# Patient Record
Sex: MALE | Race: BLACK OR AFRICAN AMERICAN | NOT HISPANIC OR LATINO | URBAN - METROPOLITAN AREA
[De-identification: names, ages, dates, MRNs, and addresses within clinical notes are randomized per-mention and may not be internally consistent; named-entity substitution may affect disease eponyms.]

---

## 2024-04-11 ENCOUNTER — APPOINTMENT (OUTPATIENT)
Dept: RADIOLOGY | Facility: HOSPITAL | Age: 69
End: 2024-04-11

## 2024-04-11 ENCOUNTER — CLINICAL SUPPORT (OUTPATIENT)
Dept: EMERGENCY MEDICINE | Facility: HOSPITAL | Age: 69
End: 2024-04-11

## 2024-04-11 ENCOUNTER — HOSPITAL ENCOUNTER (EMERGENCY)
Facility: HOSPITAL | Age: 69
Discharge: HOME | End: 2024-04-11
Attending: EMERGENCY MEDICINE

## 2024-04-11 VITALS
SYSTOLIC BLOOD PRESSURE: 148 MMHG | BODY MASS INDEX: 22.89 KG/M2 | WEIGHT: 188 LBS | OXYGEN SATURATION: 95 % | TEMPERATURE: 97.5 F | HEIGHT: 76 IN | RESPIRATION RATE: 16 BRPM | HEART RATE: 69 BPM | DIASTOLIC BLOOD PRESSURE: 104 MMHG

## 2024-04-11 DIAGNOSIS — R51.9 ACUTE INTRACTABLE HEADACHE, UNSPECIFIED HEADACHE TYPE: Primary | ICD-10-CM

## 2024-04-11 DIAGNOSIS — I10 HYPERTENSION, UNSPECIFIED TYPE: ICD-10-CM

## 2024-04-11 LAB
ALBUMIN SERPL BCP-MCNC: 4.3 G/DL (ref 3.4–5)
ALP SERPL-CCNC: 88 U/L (ref 33–136)
ALT SERPL W P-5'-P-CCNC: 17 U/L (ref 10–52)
ANION GAP SERPL CALC-SCNC: 15 MMOL/L (ref 10–20)
AST SERPL W P-5'-P-CCNC: 17 U/L (ref 9–39)
ATRIAL RATE: 78 BPM
BASOPHILS # BLD AUTO: 0.08 X10*3/UL (ref 0–0.1)
BASOPHILS NFR BLD AUTO: 0.9 %
BILIRUB SERPL-MCNC: 0.6 MG/DL (ref 0–1.2)
BUN SERPL-MCNC: 23 MG/DL (ref 6–23)
CALCIUM SERPL-MCNC: 10 MG/DL (ref 8.6–10.6)
CARDIAC TROPONIN I PNL SERPL HS: 14 NG/L (ref 0–53)
CARDIAC TROPONIN I PNL SERPL HS: 16 NG/L (ref 0–53)
CHLORIDE SERPL-SCNC: 105 MMOL/L (ref 98–107)
CO2 SERPL-SCNC: 25 MMOL/L (ref 21–32)
CREAT SERPL-MCNC: 1.3 MG/DL (ref 0.5–1.3)
EGFRCR SERPLBLD CKD-EPI 2021: 60 ML/MIN/1.73M*2
EOSINOPHIL # BLD AUTO: 0.43 X10*3/UL (ref 0–0.7)
EOSINOPHIL NFR BLD AUTO: 5 %
ERYTHROCYTE [DISTWIDTH] IN BLOOD BY AUTOMATED COUNT: 14 % (ref 11.5–14.5)
GLUCOSE SERPL-MCNC: 99 MG/DL (ref 74–99)
HCT VFR BLD AUTO: 44.5 % (ref 41–52)
HGB BLD-MCNC: 14.6 G/DL (ref 13.5–17.5)
HOLD SPECIMEN: NORMAL
IMM GRANULOCYTES # BLD AUTO: 0.02 X10*3/UL (ref 0–0.7)
IMM GRANULOCYTES NFR BLD AUTO: 0.2 % (ref 0–0.9)
LYMPHOCYTES # BLD AUTO: 1.46 X10*3/UL (ref 1.2–4.8)
LYMPHOCYTES NFR BLD AUTO: 16.8 %
MCH RBC QN AUTO: 27.3 PG (ref 26–34)
MCHC RBC AUTO-ENTMCNC: 32.8 G/DL (ref 32–36)
MCV RBC AUTO: 83 FL (ref 80–100)
MONOCYTES # BLD AUTO: 0.85 X10*3/UL (ref 0.1–1)
MONOCYTES NFR BLD AUTO: 9.8 %
NEUTROPHILS # BLD AUTO: 5.84 X10*3/UL (ref 1.2–7.7)
NEUTROPHILS NFR BLD AUTO: 67.3 %
NRBC BLD-RTO: 0 /100 WBCS (ref 0–0)
P AXIS: 75 DEGREES
P OFFSET: 183 MS
P ONSET: 143 MS
PLATELET # BLD AUTO: 187 X10*3/UL (ref 150–450)
POTASSIUM SERPL-SCNC: 4.1 MMOL/L (ref 3.5–5.3)
PR INTERVAL: 158 MS
PROT SERPL-MCNC: 7.8 G/DL (ref 6.4–8.2)
Q ONSET: 222 MS
QRS COUNT: 13 BEATS
QRS DURATION: 106 MS
QT INTERVAL: 410 MS
QTC CALCULATION(BAZETT): 467 MS
QTC FREDERICIA: 447 MS
R AXIS: 69 DEGREES
RBC # BLD AUTO: 5.35 X10*6/UL (ref 4.5–5.9)
SODIUM SERPL-SCNC: 141 MMOL/L (ref 136–145)
T AXIS: 33 DEGREES
T OFFSET: 427 MS
VENTRICULAR RATE: 78 BPM
WBC # BLD AUTO: 8.7 X10*3/UL (ref 4.4–11.3)

## 2024-04-11 PROCEDURE — 99285 EMERGENCY DEPT VISIT HI MDM: CPT | Mod: 25 | Performed by: EMERGENCY MEDICINE

## 2024-04-11 PROCEDURE — 80053 COMPREHEN METABOLIC PANEL: CPT | Performed by: EMERGENCY MEDICINE

## 2024-04-11 PROCEDURE — 84484 ASSAY OF TROPONIN QUANT: CPT

## 2024-04-11 PROCEDURE — 70450 CT HEAD/BRAIN W/O DYE: CPT | Performed by: RADIOLOGY

## 2024-04-11 PROCEDURE — 85025 COMPLETE CBC W/AUTO DIFF WBC: CPT | Performed by: EMERGENCY MEDICINE

## 2024-04-11 PROCEDURE — 36415 COLL VENOUS BLD VENIPUNCTURE: CPT | Performed by: EMERGENCY MEDICINE

## 2024-04-11 PROCEDURE — 70450 CT HEAD/BRAIN W/O DYE: CPT

## 2024-04-11 PROCEDURE — 93005 ELECTROCARDIOGRAM TRACING: CPT

## 2024-04-11 PROCEDURE — 93010 ELECTROCARDIOGRAM REPORT: CPT | Performed by: EMERGENCY MEDICINE

## 2024-04-11 PROCEDURE — 71046 X-RAY EXAM CHEST 2 VIEWS: CPT | Performed by: RADIOLOGY

## 2024-04-11 PROCEDURE — 71046 X-RAY EXAM CHEST 2 VIEWS: CPT

## 2024-04-11 PROCEDURE — 99285 EMERGENCY DEPT VISIT HI MDM: CPT | Performed by: EMERGENCY MEDICINE

## 2024-04-11 PROCEDURE — 84484 ASSAY OF TROPONIN QUANT: CPT | Performed by: EMERGENCY MEDICINE

## 2024-04-11 PROCEDURE — 36415 COLL VENOUS BLD VENIPUNCTURE: CPT

## 2024-04-11 RX ORDER — ACETAMINOPHEN 325 MG/1
975 TABLET ORAL ONCE
Status: COMPLETED | OUTPATIENT
Start: 2024-04-11 | End: 2024-04-11

## 2024-04-11 RX ORDER — LABETALOL HYDROCHLORIDE 5 MG/ML
20 INJECTION, SOLUTION INTRAVENOUS ONCE
Status: DISCONTINUED | OUTPATIENT
Start: 2024-04-11 | End: 2024-04-11

## 2024-04-11 RX ADMIN — ACETAMINOPHEN 975 MG: 325 TABLET ORAL at 09:41

## 2024-04-11 ASSESSMENT — PAIN SCALES - GENERAL
PAINLEVEL_OUTOF10: 8
PAINLEVEL_OUTOF10: 8

## 2024-04-11 ASSESSMENT — HEART SCORE
ECG: NORMAL
RISK FACTORS: 1-2 RISK FACTORS
TROPONIN: LESS THAN OR EQUAL TO NORMAL LIMIT
AGE: 65+
HISTORY: SLIGHTLY SUSPICIOUS
HEART SCORE: 3

## 2024-04-11 ASSESSMENT — LIFESTYLE VARIABLES
EVER FELT BAD OR GUILTY ABOUT YOUR DRINKING: NO
HAVE PEOPLE ANNOYED YOU BY CRITICIZING YOUR DRINKING: NO
TOTAL SCORE: 0
HAVE YOU EVER FELT YOU SHOULD CUT DOWN ON YOUR DRINKING: NO
EVER HAD A DRINK FIRST THING IN THE MORNING TO STEADY YOUR NERVES TO GET RID OF A HANGOVER: NO

## 2024-04-11 ASSESSMENT — COLUMBIA-SUICIDE SEVERITY RATING SCALE - C-SSRS
6. HAVE YOU EVER DONE ANYTHING, STARTED TO DO ANYTHING, OR PREPARED TO DO ANYTHING TO END YOUR LIFE?: NO
1. IN THE PAST MONTH, HAVE YOU WISHED YOU WERE DEAD OR WISHED YOU COULD GO TO SLEEP AND NOT WAKE UP?: NO
2. HAVE YOU ACTUALLY HAD ANY THOUGHTS OF KILLING YOURSELF?: NO

## 2024-04-11 ASSESSMENT — PAIN - FUNCTIONAL ASSESSMENT: PAIN_FUNCTIONAL_ASSESSMENT: 0-10

## 2024-04-11 ASSESSMENT — PAIN DESCRIPTION - LOCATION: LOCATION: CHEST

## 2024-04-11 NOTE — ED PROVIDER NOTES
CC: Headache     History provided by: Patient  Limitations to History: None    HPI:  Patient is a 68-year-old male with history of schizophrenia, HTN, lung cancer s/p resection who presents with chest pain and headache.  He endorses headache that began approximately 4 hours prior to arrival, it is an 8 out of 10.  He states he usually does not get headaches.  Per triage note patient has been noncompliant with his hypertensive medications however he tells me he is taking a blood pressure pill daily.  He is unable to tell me which medication.  He states he did take Abilify 3 days ago for schizophrenia.  He denies any auditory visual hallucinations at this time.  He denies any radiating chest pain but states it is left-sided, denies history of aortic dissection, numbness, tingling.  He denies any shortness of breath, leg swelling, history of blood clots.  Denies any alcohol, cocaine, other illicit drug use or tobacco use today.  Patient states he has a headache because he has not eaten in 4 days on reevaluation.    I reviewed discharge summary from November 2018 patient was admitted for schizophrenia related delirium, cocaine use disorder.    External Records Reviewed:  I reviewed prior ED visits, Care Everywhere, discharge summaries and outpatient records as appropriate.   ???????????????????????????????????????????????????????????????  Triage Vitals:  T 36.4 °C (97.5 °F)  HR 75  BP (!) 179/120  RR 18  O2 95 %      Physical Exam  Vitals and nursing note reviewed.   Constitutional:       General: He is not in acute distress.     Appearance: Normal appearance.   HENT:      Head: Normocephalic and atraumatic.   Eyes:      Conjunctiva/sclera: Conjunctivae normal.   Cardiovascular:      Rate and Rhythm: Normal rate and regular rhythm.   Pulmonary:      Effort: Pulmonary effort is normal. No respiratory distress.      Breath sounds: Normal breath sounds.   Abdominal:      General: Abdomen is flat. There is no  distension.      Palpations: Abdomen is soft.      Tenderness: There is no abdominal tenderness.   Musculoskeletal:         General: No swelling. Normal range of motion.      Cervical back: Normal range of motion and neck supple.   Skin:     General: Skin is warm and dry.   Neurological:      General: No focal deficit present.      Mental Status: He is alert and oriented to person, place, and time. Mental status is at baseline.      GCS: GCS eye subscore is 4. GCS verbal subscore is 5. GCS motor subscore is 6.      Cranial Nerves: Cranial nerves 2-12 are intact.      Sensory: Sensation is intact.      Motor: Motor function is intact.      Coordination: Coordination is intact.   Psychiatric:         Attention and Perception: He is inattentive.         Mood and Affect: Mood is anxious. Affect is labile.         Speech: Speech normal.         Behavior: Behavior normal.        ???????????????????????????????????????????????????????????????  ED Course/Treatment/Medical Decision Making  MDM:  Patient is a 68-year-old male who presents with chest pain.  Vital signs notable for hypertension, patient dates he does have a history of hypertension and is noncompliant with medication.  Differential broad and includes ACS, PE, aortic dissection, pleuritis, pneumonia, pneumothorax, pericarditis, GERD, esophageal rupture, musculoskeletal pain.  Additionally, differential for headache includes CVA, TIA, intracranial bleed.  Patient has no focal neurologic deficits on my evaluation. We will obtain CT head to evaluate for intracranial pathology.  Uncontrolled hypertension may be contributing to presentation, however BP improved without intervention. Delta troponin obtained.  Chest x-ray obtained. Lungs CTAB, patient is in no acute distress, no signs of fluid overload.  Patient has a heart score of 3, I will provide cardiology follow up outpatient for further cardiac risk stratification.   Patient has a low risk Wells score at this  time, I do not suspect acute pulmonary embolism.  He is in no acute respiratory distress.  Tolerating PO in ED. Patient does have a psychiatric history and appears disorganized however does not appear acutely decompensated this morning warranting an EPAT evaluation. Workup overall unremarkable for acute ACS, patient discharged with cardiology and pulmonology follow up for likely COPD.       ED Course:  ED Course as of 04/11/24 1216   Thu Apr 11, 2024   0903 Patient had nurse initiated protocol with labs obtained prior to my evaluation.  Metabolic panel within normal limits.  CBC within normal limits.  Initial troponin 16. [SA]   0906 EKG reviewed with normal sinus rhythm rate 78, TX interval 158 ms,  ms, QTc 467 ms, normal axis, no acute ST segment elevations or depressions, appears similar to prior [SA]   1018 CTH reviewed:  Nonspecific white matter changes most likely represent small-vessel  ischemic disease in a patient of this age and are similar to mildly  progressed from the previous examination. No definitive evidence of  acute cortical infarct, mass effect, or acute intracranial hemorrhage.   [SA]   1041 CXR:  IMPRESSION:  1. No acute cardiopulmonary process.  2. Findings suggesting emphysema.   [SA]   1052 Delta troponin stable [SA]   1112 ED Attending Documentation: This patient was seen by the resident physician.  I have seen and examined the patient, agree with the workup, evaluation, management and diagnosis. I reviewed and edited the above documentation where necessary. I have looked at the EKG and agree with the interpretation. On my evaluation of the patient: 68-year-old gentleman who complains of headache.  Patient does have schizophrenia and is fairly disorganized although I do not think he is a danger to himself or anyone else.  He is persistent that he has a migraine headache and it has been there for 4 days.  However he told the resident it started at 8 AM this morning.  Either way the  patient's head CT is negative and my suspicion for subarachnoid hemorrhage is very low.  He also complained of chest pain to the resident but denied this to me.    Jamey Vergara MD  EM Attending Physician   [RG]      ED Course User Index  [RG] Jamey Vergara MD  [SA] Cindy Chisholm DO         Diagnoses as of 04/11/24 1216   Acute intractable headache, unspecified headache type   Hypertension, unspecified type       EKG Interpretation:  See ED Course/Below:    Independent Interpretation of Studies:  I independently interpreted labs/imaging as stated in ED Course or below.    Differential diagnoses considered include but are not limited to: See MDM/Below:    Social Determinants Limiting Care:  Financial difficulties, Housing insecurity, Poor health literacy, and Mental health issues    Discussion of Management with Other Providers: See MDM/Below:    Disposition:  Discussed differential and workup results including pertinent labs/imaging and any incidental findings if applicable. Patient will follow-up with the primary physician in the next 2-3 days. Return if worse. They understand return precautions and discharge instructions. Patient and family/friend/caregiver are in agreement with this plan.       TRUE Quan, PGY-2    I reviewed the case with the attending ED physician. The attending ED physician agrees with the plan. Patient and/or patient´s representative was counseled regarding labs, imaging, likely diagnosis, and plan. All questions were answered.    Disclaimer: This note was dictated by speech recognition.  Attempt at proofreading was made to minimize errors.  Errors in transcription may be present.  Please call if questions.    Procedures ? [employer] last updated 4/11/2024 12:16 PM        Cindy Chisholm DO  Resident  04/11/24 1054       Cindy Chisholm DO  Resident  04/11/24 1216

## 2024-04-11 NOTE — DISCHARGE INSTRUCTIONS
Your blood pressure was higher than normal in the emergency department today. Please follow up closely with your primary care to have your blood pressure checked.     Blood pressure is grouped like this:  - Normal blood pressure. This is systolic of less than 120 and diastolic of less than 80 (120/80) at rest  - Elevated blood pressure. This is systolic of 120 to 129 and diastolic less than 80 at rest  - Stage 1 high blood pressure. This is systolic is 130 to 139 or diastolic between 80 to 89 at rest  - Stage 2 high blood pressure. This is when systolic is 140 or higher or the diastolic is 90 or higher at rest

## 2024-04-11 NOTE — ED TRIAGE NOTES
Pt to ED c/o headache that started an hour ago. Pt is not complaint with his BP meds, has not taken them in 7 years. Pt is also co mid-sternal chest pain, 8/10.